# Patient Record
Sex: MALE | Race: WHITE | NOT HISPANIC OR LATINO
[De-identification: names, ages, dates, MRNs, and addresses within clinical notes are randomized per-mention and may not be internally consistent; named-entity substitution may affect disease eponyms.]

---

## 2017-10-23 ENCOUNTER — OTHER (OUTPATIENT)
Age: 82
End: 2017-10-23

## 2017-11-16 ENCOUNTER — RX RENEWAL (OUTPATIENT)
Age: 82
End: 2017-11-16

## 2017-11-30 ENCOUNTER — APPOINTMENT (OUTPATIENT)
Dept: FAMILY MEDICINE | Facility: CLINIC | Age: 82
End: 2017-11-30
Payer: MEDICARE

## 2017-11-30 VITALS
OXYGEN SATURATION: 98 % | HEIGHT: 67 IN | DIASTOLIC BLOOD PRESSURE: 70 MMHG | WEIGHT: 174 LBS | HEART RATE: 70 BPM | RESPIRATION RATE: 12 BRPM | SYSTOLIC BLOOD PRESSURE: 124 MMHG | BODY MASS INDEX: 27.31 KG/M2

## 2017-11-30 DIAGNOSIS — Z87.39 PERSONAL HISTORY OF OTHER DISEASES OF THE MUSCULOSKELETAL SYSTEM AND CONNECTIVE TISSUE: ICD-10-CM

## 2017-11-30 DIAGNOSIS — Z83.6 FAMILY HISTORY OF OTHER DISEASES OF THE RESPIRATORY SYSTEM: ICD-10-CM

## 2017-11-30 DIAGNOSIS — Z78.9 OTHER SPECIFIED HEALTH STATUS: ICD-10-CM

## 2017-11-30 DIAGNOSIS — Z82.49 FAMILY HISTORY OF ISCHEMIC HEART DISEASE AND OTHER DISEASES OF THE CIRCULATORY SYSTEM: ICD-10-CM

## 2017-11-30 DIAGNOSIS — Z82.0 FAMILY HISTORY OF EPILEPSY AND OTHER DISEASES OF THE NERVOUS SYSTEM: ICD-10-CM

## 2017-11-30 PROCEDURE — 99213 OFFICE O/P EST LOW 20 MIN: CPT | Mod: 25

## 2017-11-30 PROCEDURE — 90662 IIV NO PRSV INCREASED AG IM: CPT

## 2017-11-30 PROCEDURE — G0008: CPT

## 2017-11-30 RX ORDER — TIZANIDINE 4 MG/1
4 TABLET ORAL
Qty: 30 | Refills: 1 | Status: DISCONTINUED | COMMUNITY
Start: 2017-11-16 | End: 2017-11-30

## 2017-12-15 ENCOUNTER — APPOINTMENT (OUTPATIENT)
Dept: FAMILY MEDICINE | Facility: CLINIC | Age: 82
End: 2017-12-15

## 2018-02-15 ENCOUNTER — NON-APPOINTMENT (OUTPATIENT)
Age: 83
End: 2018-02-15

## 2018-02-15 ENCOUNTER — APPOINTMENT (OUTPATIENT)
Dept: FAMILY MEDICINE | Facility: CLINIC | Age: 83
End: 2018-02-15
Payer: MEDICARE

## 2018-02-15 VITALS
HEIGHT: 67 IN | HEART RATE: 74 BPM | RESPIRATION RATE: 12 BRPM | SYSTOLIC BLOOD PRESSURE: 122 MMHG | WEIGHT: 176 LBS | OXYGEN SATURATION: 97 % | DIASTOLIC BLOOD PRESSURE: 80 MMHG | BODY MASS INDEX: 27.62 KG/M2

## 2018-02-15 DIAGNOSIS — N40.0 BENIGN PROSTATIC HYPERPLASIA WITHOUT LOWER URINARY TRACT SYMPMS: ICD-10-CM

## 2018-02-15 DIAGNOSIS — Z00.00 ENCOUNTER FOR GENERAL ADULT MEDICAL EXAMINATION W/OUT ABNORMAL FINDINGS: ICD-10-CM

## 2018-02-15 LAB
BILIRUB UR QL STRIP: NEGATIVE
GLUCOSE UR-MCNC: NEGATIVE
HCG UR QL: 0.2 EU/DL
HGB UR QL STRIP.AUTO: NEGATIVE
KETONES UR-MCNC: NEGATIVE
LEUKOCYTE ESTERASE UR QL STRIP: NEGATIVE
NITRITE UR QL STRIP: NEGATIVE
PH UR STRIP: 7
PROT UR STRIP-MCNC: NEGATIVE
SP GR UR STRIP: 1.01

## 2018-02-15 PROCEDURE — 93000 ELECTROCARDIOGRAM COMPLETE: CPT

## 2018-02-15 PROCEDURE — 81003 URINALYSIS AUTO W/O SCOPE: CPT | Mod: QW

## 2018-02-15 PROCEDURE — G0439: CPT

## 2018-04-02 ENCOUNTER — RX RENEWAL (OUTPATIENT)
Age: 83
End: 2018-04-02

## 2018-07-12 ENCOUNTER — RX RENEWAL (OUTPATIENT)
Age: 83
End: 2018-07-12

## 2018-07-12 RX ORDER — ALPRAZOLAM 0.25 MG/1
0.25 TABLET ORAL
Qty: 100 | Refills: 0 | Status: DISCONTINUED | COMMUNITY
Start: 2018-04-02 | End: 2018-07-12

## 2018-10-02 ENCOUNTER — APPOINTMENT (OUTPATIENT)
Dept: FAMILY MEDICINE | Facility: CLINIC | Age: 83
End: 2018-10-02
Payer: MEDICARE

## 2018-10-02 VITALS
OXYGEN SATURATION: 97 % | SYSTOLIC BLOOD PRESSURE: 110 MMHG | DIASTOLIC BLOOD PRESSURE: 70 MMHG | HEART RATE: 62 BPM | RESPIRATION RATE: 12 BRPM

## 2018-10-02 PROCEDURE — 90662 IIV NO PRSV INCREASED AG IM: CPT

## 2018-10-02 PROCEDURE — G0008: CPT

## 2018-10-02 PROCEDURE — 99213 OFFICE O/P EST LOW 20 MIN: CPT | Mod: 25

## 2018-10-02 RX ORDER — METOPROLOL SUCCINATE 50 MG/1
50 TABLET, EXTENDED RELEASE ORAL DAILY
Qty: 3 | Refills: 0 | Status: ACTIVE | COMMUNITY
Start: 2017-06-26

## 2018-10-02 NOTE — PHYSICAL EXAM
[No Acute Distress] : no acute distress [Well Nourished] : well nourished [Well Developed] : well developed [Well-Appearing] : well-appearing [No JVD] : no jugular venous distention [Supple] : supple [No Lymphadenopathy] : no lymphadenopathy [No Respiratory Distress] : no respiratory distress  [Clear to Auscultation] : lungs were clear to auscultation bilaterally [Normal Rate] : normal rate  [Regular Rhythm] : with a regular rhythm [No Carotid Bruits] : no carotid bruits [Normal Posterior Cervical Nodes] : no posterior cervical lymphadenopathy [Normal Anterior Cervical Nodes] : no anterior cervical lymphadenopathy [de-identified] : 1-2/6 murmur [de-identified] : some memory issues

## 2018-10-02 NOTE — HISTORY OF PRESENT ILLNESS
[FreeTextEntry1] : pt presents for med check and flu shot  [de-identified] : Pt here for management of HTN and insomnia.

## 2018-10-02 NOTE — ASSESSMENT
[FreeTextEntry1] : 1. HTN- BP well controlled - 110/70- on  metoprolol succinate 50 mg 1/day.  2. and 3. Rx alprazolam 0.25 mg  # 100 - usually only used at HS.  Use/precautions reviewed. 4 HD flu shot given.

## 2018-10-22 ENCOUNTER — APPOINTMENT (OUTPATIENT)
Dept: FAMILY MEDICINE | Facility: CLINIC | Age: 83
End: 2018-10-22
Payer: MEDICARE

## 2018-10-22 VITALS
SYSTOLIC BLOOD PRESSURE: 114 MMHG | HEART RATE: 68 BPM | RESPIRATION RATE: 12 BRPM | OXYGEN SATURATION: 97 % | DIASTOLIC BLOOD PRESSURE: 70 MMHG | BODY MASS INDEX: 26.31 KG/M2 | WEIGHT: 168 LBS

## 2018-10-22 DIAGNOSIS — G45.9 TRANSIENT CEREBRAL ISCHEMIC ATTACK, UNSPECIFIED: ICD-10-CM

## 2018-10-22 PROCEDURE — 99214 OFFICE O/P EST MOD 30 MIN: CPT

## 2018-10-22 NOTE — HISTORY OF PRESENT ILLNESS
[FreeTextEntry1] : pt presents for [facility] follow up  [de-identified] : Some swelling in the front of his left shoulder .  About 9 days ago he had an issue with his left leg - couldn't move it and it felt numb. He went to [facility] ER.  He had an MRI  of the brain, saw neurology and was released with diagnosis of a TIA - placed on new medications. Left leg is now fine - it was fine about 2 hours after it initially bothered him. He will be following up with cardiology and the neurologist he saw in the hospital. In the ER his BP was 170/100

## 2018-10-22 NOTE — REVIEW OF SYSTEMS
[Joint Pain] : joint pain [Joint Stiffness] : joint stiffness [Memory Loss] : memory loss [Negative] : Heme/Lymph [FreeTextEntry9] : swelling in left anterior shoulder.

## 2018-10-22 NOTE — PHYSICAL EXAM
[No Acute Distress] : no acute distress [Well Nourished] : well nourished [Well Developed] : well developed [Well-Appearing] : well-appearing [No JVD] : no jugular venous distention [Supple] : supple [No Lymphadenopathy] : no lymphadenopathy [No Respiratory Distress] : no respiratory distress  [Clear to Auscultation] : lungs were clear to auscultation bilaterally [Normal Rate] : normal rate  [Regular Rhythm] : with a regular rhythm [No Carotid Bruits] : no carotid bruits [Normal Posterior Cervical Nodes] : no posterior cervical lymphadenopathy [Normal Anterior Cervical Nodes] : no anterior cervical lymphadenopathy [Normal Gait] : normal gait [Coordination Grossly Intact] : coordination grossly intact [No Focal Deficits] : no focal deficits [Speech Grossly Normal] : speech grossly normal [Alert and Oriented x3] : oriented to person, place, and time [Normal Insight/Judgement] : insight and judgment were intact [de-identified] : 2/6 murmur [de-identified] : soft tissue mass anterior left shoulder, nontender [de-identified] : No redness, no swelling at site of flu shot.  [de-identified] : has some memory issues - needs prompting from his wife about certain things

## 2018-10-22 NOTE — ASSESSMENT
[FreeTextEntry1] : 1. 2. and 4.  I reviewed new medications , chart updated, use and precautions all reviewed.  He now feels fine. BP is excellent - 114/70.  I reviewed follow up with both cardiology and neurology. Diet/actvities/fall precautions all discussed.   3. Lipoma of left shoulder - discussed what it is and just observe.

## 2018-11-29 LAB — PSA SERPL-MCNC: NORMAL

## 2018-12-31 ENCOUNTER — MEDICATION RENEWAL (OUTPATIENT)
Age: 83
End: 2018-12-31

## 2019-01-04 ENCOUNTER — APPOINTMENT (OUTPATIENT)
Dept: FAMILY MEDICINE | Facility: CLINIC | Age: 84
End: 2019-01-04
Payer: MEDICARE

## 2019-01-04 VITALS
WEIGHT: 168 LBS | HEIGHT: 67 IN | BODY MASS INDEX: 26.37 KG/M2 | DIASTOLIC BLOOD PRESSURE: 70 MMHG | RESPIRATION RATE: 14 BRPM | HEART RATE: 73 BPM | SYSTOLIC BLOOD PRESSURE: 110 MMHG | OXYGEN SATURATION: 98 %

## 2019-01-04 PROCEDURE — 99213 OFFICE O/P EST LOW 20 MIN: CPT

## 2019-01-04 NOTE — HISTORY OF PRESENT ILLNESS
[FreeTextEntry1] : Patient presents for med check\par  [de-identified] : Pt here for treatment of anxiety and insomnia.

## 2019-01-04 NOTE — HEALTH RISK ASSESSMENT
[] : No [No falls in past year] : Patient reported no falls in the past year [0] : 2) Feeling down, depressed, or hopeless: Not at all (0) [de-identified] : social [[id number]] : 0

## 2019-01-04 NOTE — ASSESSMENT
[FreeTextEntry1] : 1. and 2.  Rx for alprazolam 0.25 mg # 100 MDD 4-  Use/precautions/safety of medication all reviewed.  [place]   Diet/activity/fall precautions reviewed. \par

## 2019-01-04 NOTE — PHYSICAL EXAM
[No Acute Distress] : no acute distress [Well Nourished] : well nourished [Well Developed] : well developed [Well-Appearing] : well-appearing [No JVD] : no jugular venous distention [Supple] : supple [No Lymphadenopathy] : no lymphadenopathy [No Respiratory Distress] : no respiratory distress  [Clear to Auscultation] : lungs were clear to auscultation bilaterally [No Accessory Muscle Use] : no accessory muscle use [Normal Rate] : normal rate  [Regular Rhythm] : with a regular rhythm [No Murmur] : no murmur heard [No Carotid Bruits] : no carotid bruits [Normal Posterior Cervical Nodes] : no posterior cervical lymphadenopathy [Normal Anterior Cervical Nodes] : no anterior cervical lymphadenopathy [Speech Grossly Normal] : speech grossly normal [Memory Grossly Normal] : memory grossly normal [Normal Affect] : the affect was normal [Alert and Oriented x3] : oriented to person, place, and time [Normal Mood] : the mood was normal [Normal Insight/Judgement] : insight and judgment were intact

## 2019-04-19 ENCOUNTER — APPOINTMENT (OUTPATIENT)
Dept: FAMILY MEDICINE | Facility: CLINIC | Age: 84
End: 2019-04-19
Payer: MEDICARE

## 2019-04-19 VITALS
HEART RATE: 80 BPM | OXYGEN SATURATION: 97 % | RESPIRATION RATE: 14 BRPM | BODY MASS INDEX: 26.53 KG/M2 | SYSTOLIC BLOOD PRESSURE: 110 MMHG | WEIGHT: 169 LBS | HEIGHT: 67 IN | DIASTOLIC BLOOD PRESSURE: 70 MMHG

## 2019-04-19 PROCEDURE — 99213 OFFICE O/P EST LOW 20 MIN: CPT

## 2019-04-19 RX ORDER — DUTASTERIDE 0.5 MG/1
0.5 CAPSULE, LIQUID FILLED ORAL
Qty: 90 | Refills: 3 | Status: ACTIVE | COMMUNITY
Start: 2019-04-19

## 2019-04-19 RX ORDER — APIXABAN 5 MG/1
5 TABLET, FILM COATED ORAL
Qty: 60 | Refills: 3 | Status: ACTIVE | COMMUNITY
Start: 2019-04-19

## 2019-04-19 RX ORDER — ASPIRIN 81 MG/1
81 TABLET ORAL
Refills: 0 | Status: DISCONTINUED | COMMUNITY
End: 2019-04-19

## 2019-04-19 RX ORDER — INDOMETHACIN 50 MG/1
50 CAPSULE ORAL 3 TIMES DAILY
Qty: 30 | Refills: 2 | Status: DISCONTINUED | COMMUNITY
Start: 2017-11-30 | End: 2019-04-19

## 2019-04-19 RX ORDER — CLOPIDOGREL BISULFATE 75 MG/1
75 TABLET, FILM COATED ORAL
Refills: 0 | Status: DISCONTINUED | COMMUNITY
End: 2019-04-19

## 2019-04-19 RX ORDER — TAMSULOSIN HYDROCHLORIDE 0.4 MG/1
0.4 CAPSULE ORAL
Qty: 90 | Refills: 0 | Status: DISCONTINUED | COMMUNITY
Start: 2017-01-13 | End: 2019-04-19

## 2019-04-19 NOTE — ASSESSMENT
[FreeTextEntry1] : 1.  and 2.  Alprazolam was renewed  Use/precautions/safety of medication all reviewed.  [place]\par 3.  Diet and use of atorvastatin were reviewed.

## 2019-04-19 NOTE — HISTORY OF PRESENT ILLNESS
[FreeTextEntry1] : Patient presents for follow up\par  [de-identified] : Here for management of anxiety and insomnia. He is starting to have memory issues and at times his wife has a difficult time with him - he gets angry about his situation.  Using one half a tablet at these times helps calm him down.

## 2019-04-19 NOTE — REVIEW OF SYSTEMS
[Joint Pain] : joint pain [Joint Stiffness] : joint stiffness [Memory Loss] : memory loss [Insomnia] : insomnia [Anxiety] : anxiety [Negative] : Psychiatric

## 2019-04-19 NOTE — HEALTH RISK ASSESSMENT
[] : No [No falls in past year] : Patient reported no falls in the past year [0] : 2) Feeling down, depressed, or hopeless: Not at all (0) [de-identified] : social [de-identified] : no exercise [de-identified] : low fat [[id number]] : 0

## 2019-06-24 ENCOUNTER — APPOINTMENT (OUTPATIENT)
Dept: FAMILY MEDICINE | Facility: CLINIC | Age: 84
End: 2019-06-24
Payer: MEDICARE

## 2019-06-24 VITALS
RESPIRATION RATE: 14 BRPM | HEIGHT: 67 IN | DIASTOLIC BLOOD PRESSURE: 70 MMHG | BODY MASS INDEX: 27.47 KG/M2 | SYSTOLIC BLOOD PRESSURE: 122 MMHG | WEIGHT: 175 LBS | OXYGEN SATURATION: 97 % | HEART RATE: 74 BPM

## 2019-06-24 PROCEDURE — 99214 OFFICE O/P EST MOD 30 MIN: CPT

## 2019-06-24 NOTE — ASSESSMENT
[FreeTextEntry1] : Advised follow up with cardiology given dyspnea on exertion and LE swelling \par Echo ordered r/o CHF \par CXR r/o pneumonia \par Start antibiotics - take with food. \par Vitals stable today \par Rest, fluids, flonase, vitamin C \par     - patient instructed to RTO if symptoms worsen or persist, if fevers develop, does not get better in 7 days.\par \par Pt and wife advised to call me or go directly to nearest ED if sob acutely worsens, chest pain, fevers/chills develop. \par

## 2019-06-24 NOTE — REVIEW OF SYSTEMS
[Fatigue] : fatigue [Lower Ext Edema] : lower extremity edema [Postnasal Drip] : postnasal drip [Cough] : cough [Dyspnea on Exertion] : dyspnea on exertion [Negative] : Heme/Lymph [FreeTextEntry4] : congestion, sinus pressure

## 2019-06-24 NOTE — HISTORY OF PRESENT ILLNESS
[___ Weeks ago] :  [unfilled] weeks ago [Spouse] : spouse [FreeTextEntry8] : +chest congestion, +SOB with activity, + dry cough, +sinus pressure + PND +bilateral LE swelling x 2 weeks. He was recently traveling in [place]. Pt and wife state that he found he was getting more fatigued than normal, had to stop more frequently to catch his breath. Patient recently saw his cardiologist at the beginning of the month but he did not have swelling at that time. He has been taking dayquil and nyquil with some relief. Denies fevers, chills, chest pain, palpitations, dizziness, syncope, ear pain, headache, nasal congestion.

## 2019-06-24 NOTE — PHYSICAL EXAM
[No Acute Distress] : no acute distress [Well Nourished] : well nourished [Well-Appearing] : well-appearing [Normal Sclera/Conjunctiva] : normal sclera/conjunctiva [Well Developed] : well developed [Supple] : supple [No Respiratory Distress] : no respiratory distress  [Normal Outer Ear/Nose] : the outer ears and nose were normal in appearance [Normal Rate] : normal rate  [No Accessory Muscle Use] : no accessory muscle use [Pedal Pulses Present] : the pedal pulses are present [Normal S1, S2] : normal S1 and S2 [No Extremity Clubbing/Cyanosis] : no extremity clubbing/cyanosis [Normal Gait] : normal gait [Coordination Grossly Intact] : coordination grossly intact [No Focal Deficits] : no focal deficits [Alert and Oriented x3] : oriented to person, place, and time [Normal Affect] : the affect was normal [Normal Insight/Judgement] : insight and judgment were intact [de-identified] : bilateral nasal turbinates and posterior oropharynx erythematous, mild clear post nasal drip [de-identified] : mild cervical adenopathy [de-identified] : Diminished breath sounds auscultated to LLL, otherwise CTA.  [de-identified] : irregular, h/o afib, 2/6 systolic murmur [de-identified] : 2+ bilateral LE edema L>R.

## 2019-07-19 ENCOUNTER — MEDICATION RENEWAL (OUTPATIENT)
Age: 84
End: 2019-07-19

## 2019-08-12 ENCOUNTER — APPOINTMENT (OUTPATIENT)
Dept: FAMILY MEDICINE | Facility: CLINIC | Age: 84
End: 2019-08-12
Payer: MEDICARE

## 2019-08-12 VITALS
OXYGEN SATURATION: 97 % | WEIGHT: 172 LBS | BODY MASS INDEX: 27 KG/M2 | HEIGHT: 67 IN | SYSTOLIC BLOOD PRESSURE: 120 MMHG | RESPIRATION RATE: 14 BRPM | DIASTOLIC BLOOD PRESSURE: 70 MMHG | HEART RATE: 77 BPM

## 2019-08-12 PROCEDURE — 99213 OFFICE O/P EST LOW 20 MIN: CPT

## 2019-08-12 NOTE — ASSESSMENT
[FreeTextEntry1] : Start medrol pack, take with food\par PT offered, pt declined\par Advised to stop nsaids d/t increased bleeding risk. advised to take tylenol 1000mg TID prn pain\par RTO if symptoms worsen or persist.

## 2019-08-12 NOTE — PHYSICAL EXAM
[Normal] : affect was normal and insight and judgment were intact [de-identified] : R elbow: no swelling, crepitus, or erythema noted. Pain noted with active ROM, full strength and no limitation of ROM.

## 2019-08-12 NOTE — HISTORY OF PRESENT ILLNESS
[FreeTextEntry8] : pt c/o R elbow and forearm pain x 1 week, Pt states he was doing yard work and was using a heavy weed whacker. Denies swelling, redness, numbness, tingling, weakness. Reports difficulty combing his hair due to pain from that motion. Has been taking ibuprofen with some relief.

## 2019-08-16 ENCOUNTER — RX RENEWAL (OUTPATIENT)
Age: 84
End: 2019-08-16

## 2019-10-12 ENCOUNTER — RX RENEWAL (OUTPATIENT)
Age: 84
End: 2019-10-12

## 2019-10-30 ENCOUNTER — APPOINTMENT (OUTPATIENT)
Dept: FAMILY MEDICINE | Facility: CLINIC | Age: 84
End: 2019-10-30
Payer: MEDICARE

## 2019-10-30 VITALS
OXYGEN SATURATION: 97 % | DIASTOLIC BLOOD PRESSURE: 70 MMHG | RESPIRATION RATE: 12 BRPM | HEART RATE: 71 BPM | SYSTOLIC BLOOD PRESSURE: 120 MMHG

## 2019-10-30 DIAGNOSIS — R06.02 SHORTNESS OF BREATH: ICD-10-CM

## 2019-10-30 DIAGNOSIS — S56.911A STRAIN OF UNSPECIFIED MUSCLES, FASCIA AND TENDONS AT FOREARM LEVEL, RIGHT ARM, INITIAL ENCOUNTER: ICD-10-CM

## 2019-10-30 DIAGNOSIS — R06.89 OTHER ABNORMALITIES OF BREATHING: ICD-10-CM

## 2019-10-30 DIAGNOSIS — M25.521 PAIN IN RIGHT ELBOW: ICD-10-CM

## 2019-10-30 PROCEDURE — 90653 IIV ADJUVANT VACCINE IM: CPT

## 2019-10-30 PROCEDURE — G0009: CPT

## 2019-10-30 PROCEDURE — 90732 PPSV23 VACC 2 YRS+ SUBQ/IM: CPT

## 2019-10-30 PROCEDURE — G0008: CPT

## 2019-10-30 PROCEDURE — 99214 OFFICE O/P EST MOD 30 MIN: CPT | Mod: 25

## 2019-10-30 NOTE — HISTORY OF PRESENT ILLNESS
[FreeTextEntry1] : pt presents for med check  [de-identified] : Patient requesting renewal of xanax. Patient tolerates xanax well, states it helps to relieve his ongoing anxiety and helps him to sleep better. Denies any aberrant behaviors or side effects or any suicidal thoughts/plans.

## 2019-10-30 NOTE — ASSESSMENT
[FreeTextEntry1] : -BP well controlled today - continue current regimen , pt will follow up with cardiologist as scheduled. \par - Patient advised of harmful side effects and risk of dependence with long term use of benzodiazepines. Advised caution related to sedative effect and and respiratory depression. Advised not to take while driving or in combination with alcohol. Meditation, mindfulness, exercise, and psychotherapy encouraged. ISTOP checked. Reference #: [id number] \par

## 2019-10-30 NOTE — PHYSICAL EXAM
[Normal Rate] : normal rate  [Normal S1, S2] : normal S1 and S2 [Normal] : affect was normal and insight and judgment were intact [No Murmur] : no murmur heard

## 2019-11-14 ENCOUNTER — APPOINTMENT (OUTPATIENT)
Dept: FAMILY MEDICINE | Facility: CLINIC | Age: 84
End: 2019-11-14
Payer: MEDICARE

## 2019-11-14 VITALS
RESPIRATION RATE: 14 BRPM | OXYGEN SATURATION: 96 % | DIASTOLIC BLOOD PRESSURE: 70 MMHG | WEIGHT: 172 LBS | SYSTOLIC BLOOD PRESSURE: 120 MMHG | BODY MASS INDEX: 27 KG/M2 | HEART RATE: 60 BPM | HEIGHT: 67 IN

## 2019-11-14 PROCEDURE — 99214 OFFICE O/P EST MOD 30 MIN: CPT

## 2019-11-14 NOTE — HISTORY OF PRESENT ILLNESS
[FreeTextEntry8] : Patient presents with a chief complaint of back pain x 2 weeks. Pt states he was cleaning up leaves outside. States pain is located in L mid back on left side. He has been taking ibuprofen with some relief. Denies any difficulty ambulating, weakness, numbness or tingling. Patient also reports associated shortness of breath with exertion such as walking or going uphill. Denies any sob at rest, chest pain, palpitations, dizziness, syncope, diaphoresis, or swelling. He states he saw his cardiologist approximately 6 weeks ago, had no issues, plans to see cardio again in December.

## 2019-11-14 NOTE — PHYSICAL EXAM
[Normal Sclera/Conjunctiva] : normal sclera/conjunctiva [Normal Outer Ear/Nose] : the outer ears and nose were normal in appearance [Normal Rate] : normal rate  [Normal S1, S2] : normal S1 and S2 [No Murmur] : no murmur heard [Normal] : affect was normal and insight and judgment were intact [de-identified] : irregular rhythm  [de-identified] : l

## 2019-11-14 NOTE — ASSESSMENT
[FreeTextEntry1] : Vitals and exam stable \par Pt advised to follow up with cardiology asap, he will notify me if he cannot get a timely appt. \par If sob worsens or chest pain develops pt will go to ER for evaluation, agreeable to plan\par RTO for full physical and fasting labs \par Back pain: Instructed to use heat, do stretches, salonpas pain patch prn\par Advised to hold ibuprofen, may take tylenol 1000mg prn  \par Instructed to take flexeril only before bedtime, advised of drowsiness side effect\par Instructed to get back brace for support\par \par \par

## 2019-12-16 ENCOUNTER — APPOINTMENT (OUTPATIENT)
Dept: FAMILY MEDICINE | Facility: CLINIC | Age: 84
End: 2019-12-16

## 2019-12-26 ENCOUNTER — APPOINTMENT (OUTPATIENT)
Dept: FAMILY MEDICINE | Facility: CLINIC | Age: 84
End: 2019-12-26
Payer: MEDICARE

## 2019-12-26 VITALS
OXYGEN SATURATION: 97 % | SYSTOLIC BLOOD PRESSURE: 110 MMHG | DIASTOLIC BLOOD PRESSURE: 70 MMHG | WEIGHT: 172 LBS | HEART RATE: 72 BPM | BODY MASS INDEX: 27 KG/M2 | HEIGHT: 67 IN | RESPIRATION RATE: 14 BRPM

## 2019-12-26 DIAGNOSIS — M62.830 MUSCLE SPASM OF BACK: ICD-10-CM

## 2019-12-26 DIAGNOSIS — M25.476 EFFUSION, UNSPECIFIED ANKLE: ICD-10-CM

## 2019-12-26 DIAGNOSIS — Z87.09 PERSONAL HISTORY OF OTHER DISEASES OF THE RESPIRATORY SYSTEM: ICD-10-CM

## 2019-12-26 DIAGNOSIS — R69 ILLNESS, UNSPECIFIED: ICD-10-CM

## 2019-12-26 DIAGNOSIS — Z92.29 PERSONAL HISTORY OF OTHER DRUG THERAPY: ICD-10-CM

## 2019-12-26 DIAGNOSIS — M25.473 EFFUSION, UNSPECIFIED ANKLE: ICD-10-CM

## 2019-12-26 DIAGNOSIS — Z09 ENCOUNTER FOR FOLLOW-UP EXAMINATION AFTER COMPLETED TREATMENT FOR CONDITIONS OTHER THAN MALIGNANT NEOPLASM: ICD-10-CM

## 2019-12-26 DIAGNOSIS — R06.02 SHORTNESS OF BREATH: ICD-10-CM

## 2019-12-26 PROCEDURE — 99214 OFFICE O/P EST MOD 30 MIN: CPT

## 2019-12-26 RX ORDER — AMOXICILLIN AND CLAVULANATE POTASSIUM 875; 125 MG/1; MG/1
875-125 TABLET, COATED ORAL
Qty: 20 | Refills: 0 | Status: COMPLETED | COMMUNITY
Start: 2019-06-24 | End: 2019-12-26

## 2019-12-26 NOTE — PHYSICAL EXAM
[Normal Outer Ear/Nose] : the outer ears and nose were normal in appearance [Normal Sclera/Conjunctiva] : normal sclera/conjunctiva [Normal] : affect was normal and insight and judgment were intact

## 2019-12-26 NOTE — HISTORY OF PRESENT ILLNESS
[Spouse] : spouse [FreeTextEntry1] : Patient presents for 1 month follow up\par  [de-identified] : Patient reports today for follow up of medical conditions. Patient reports feeling well today no acute complaints. Requesting renewal of xanax, pt states it is effective in relieving anxiety and insomnia. Denies aberrant behaviors or side effects. Denies suicidal thoughts or plans. Pt never saw cardiology - states he felt better so he did not feel it was necessary. He has appt scheduled in January with cardiology as per pt. \par \par

## 2019-12-26 NOTE — ASSESSMENT
[FreeTextEntry1] : Check labs - slip given\par Vitals and exam stable \par Continue current medication regimen\par - Patient advised of harmful side effects and risk of dependence with long term use of benzodiazepines. Advised caution related to sedative effect and and respiratory depression. Advised not to take while driving or in combination with alcohol. Meditation, mindfulness, exercise, and psychotherapy encouraged. ISTOP checked. Reference #: [id number] \par

## 2020-02-25 ENCOUNTER — RESULT REVIEW (OUTPATIENT)
Age: 85
End: 2020-02-25

## 2020-02-27 ENCOUNTER — RESULT REVIEW (OUTPATIENT)
Age: 85
End: 2020-02-27

## 2020-03-19 DIAGNOSIS — R74.8 ABNORMAL LEVELS OF OTHER SERUM ENZYMES: ICD-10-CM

## 2020-04-24 ENCOUNTER — APPOINTMENT (OUTPATIENT)
Dept: FAMILY MEDICINE | Facility: CLINIC | Age: 85
End: 2020-04-24
Payer: MEDICARE

## 2020-04-24 VITALS
HEIGHT: 67 IN | OXYGEN SATURATION: 97 % | HEART RATE: 66 BPM | WEIGHT: 173 LBS | SYSTOLIC BLOOD PRESSURE: 132 MMHG | BODY MASS INDEX: 27.15 KG/M2 | RESPIRATION RATE: 14 BRPM | DIASTOLIC BLOOD PRESSURE: 70 MMHG

## 2020-04-24 DIAGNOSIS — I38 ENDOCARDITIS, VALVE UNSPECIFIED: ICD-10-CM

## 2020-04-24 PROCEDURE — 99213 OFFICE O/P EST LOW 20 MIN: CPT

## 2020-04-24 RX ORDER — CYCLOBENZAPRINE HYDROCHLORIDE 10 MG/1
10 TABLET, FILM COATED ORAL
Qty: 30 | Refills: 0 | Status: DISCONTINUED | COMMUNITY
Start: 2019-11-14 | End: 2020-04-24

## 2020-04-24 RX ORDER — HYDROCHLOROTHIAZIDE 12.5 MG/1
12.5 TABLET ORAL
Qty: 20 | Refills: 0 | Status: DISCONTINUED | COMMUNITY
Start: 2019-06-24 | End: 2020-04-24

## 2020-04-24 RX ORDER — METHYLPREDNISOLONE 4 MG/1
4 TABLET ORAL
Qty: 1 | Refills: 0 | Status: DISCONTINUED | COMMUNITY
Start: 2019-08-12 | End: 2020-04-24

## 2020-04-24 NOTE — HISTORY OF PRESENT ILLNESS
[de-identified] : He has been having an issue with pleural effusions-  had thoracentesis done .  Felt much better.   [FreeTextEntry1] : patient presents for medication renewal\par

## 2020-04-24 NOTE — ASSESSMENT
[FreeTextEntry1] : 1. to 3.  We reviewed his need for thoracentesis  a few weeks ago - he is feeling better and  doesn't have exertional dyspnea.  He does have a follow up visit with Pulmonary.   Diet/ activity/ fall precautions all reviewed.  4. and 5.  I renewed his alprazolam 0.25 mg- Use/precautions/safety of medication all reviewed.  [place]  [id number]    RRC done. Safe use stressed.  COVID19 precautions reviewed.  His wife and I discussed his safe use of the alprazolam.  She takes care of it. \par

## 2020-04-24 NOTE — REVIEW OF SYSTEMS
[Dyspnea on Exertion] : dyspnea on exertion [Joint Pain] : joint pain [Joint Stiffness] : joint stiffness [Memory Loss] : memory loss [Insomnia] : insomnia [Anxiety] : anxiety [Negative] : Heme/Lymph

## 2020-07-27 NOTE — PHYSICAL EXAM
[Normal] : no respiratory distress, lungs were clear to auscultation bilaterally and no accessory muscle use [Normal Rate] : normal rate  Follow up with Dr Dickson in 4-6 weeks [No Edema] : there was no peripheral edema [No Carotid Bruits] : no carotid bruits [Normal Posterior Cervical Nodes] : no posterior cervical lymphadenopathy [Normal Anterior Cervical Nodes] : no anterior cervical lymphadenopathy [Speech Grossly Normal] : speech grossly normal [Alert and Oriented x3] : oriented to person, place, and time [Normal Insight/Judgement] : insight and judgment were intact [de-identified] : IR/IR,  1-2/6 murmur [de-identified] : anxious

## 2020-08-16 ENCOUNTER — RX RENEWAL (OUTPATIENT)
Age: 85
End: 2020-08-16

## 2020-08-18 ENCOUNTER — APPOINTMENT (OUTPATIENT)
Dept: FAMILY MEDICINE | Facility: CLINIC | Age: 85
End: 2020-08-18
Payer: MEDICARE

## 2020-08-18 VITALS
HEART RATE: 81 BPM | RESPIRATION RATE: 14 BRPM | HEIGHT: 67 IN | SYSTOLIC BLOOD PRESSURE: 140 MMHG | DIASTOLIC BLOOD PRESSURE: 80 MMHG | WEIGHT: 170 LBS | OXYGEN SATURATION: 96 % | TEMPERATURE: 97.8 F | BODY MASS INDEX: 26.68 KG/M2

## 2020-08-18 PROCEDURE — 99214 OFFICE O/P EST MOD 30 MIN: CPT

## 2020-08-18 RX ORDER — SILODOSIN 8 MG/1
8 CAPSULE ORAL DAILY
Qty: 30 | Refills: 0 | Status: ACTIVE | COMMUNITY
Start: 2020-08-18

## 2020-08-18 RX ORDER — ATORVASTATIN CALCIUM 10 MG/1
10 TABLET, FILM COATED ORAL
Qty: 90 | Refills: 1 | Status: ACTIVE | COMMUNITY

## 2020-08-18 RX ORDER — PNV NO.95/FERROUS FUM/FOLIC AC 28MG-0.8MG
TABLET ORAL
Refills: 0 | Status: ACTIVE | COMMUNITY

## 2020-08-18 RX ORDER — OMEGA-3/DHA/EPA/FISH OIL 910-1400MG
CAPSULE ORAL
Refills: 0 | Status: ACTIVE | COMMUNITY

## 2020-08-18 NOTE — HISTORY OF PRESENT ILLNESS
[FreeTextEntry1] : patient presents for medication renewal\par  [de-identified] : Patient reports today for follow up of medical conditions. Patient reports feeling well today no acute complaints. Requesting renewal of xanax, pt states it is effective in relieving anxiety and insomnia. Denies aberrant behaviors or side effects. Denies suicidal thoughts or plans. He is following up with dr sandhu for pleural effusions, which he is on lasix for. Pt has no sob. Follow up with cardio is in 6 weeks as per pt. \par

## 2020-08-18 NOTE — PHYSICAL EXAM
[Normal Sclera/Conjunctiva] : normal sclera/conjunctiva [Normal Outer Ear/Nose] : the outer ears and nose were normal in appearance [Normal Rate] : normal rate  [Normal S1, S2] : normal S1 and S2 [No Edema] : there was no peripheral edema [No Focal Deficits] : no focal deficits [Normal] : affect was normal and insight and judgment were intact [de-identified] : IRREGULAR

## 2020-08-18 NOTE — ASSESSMENT
[FreeTextEntry1] : Vitals and exam stable, bp borderline - pt will monitor bp at home to make sure it stays at goal <140/90. Patient states he didn't take his morning medication yet. Follow up cardiology.  \par Follow up pulmonary for h/o pleural effusion, lungs clear today\par Continue current medication regimen\par - Patient advised of harmful side effects and risk of dependence with long term use of benzodiazepines. Advised caution related to sedative effect and and respiratory depression. Advised not to take while driving or in combination with alcohol. Meditation, mindfulness, exercise, and psychotherapy encouraged. ISTOP checked. Reference #: [id number] \par

## 2020-11-18 ENCOUNTER — APPOINTMENT (OUTPATIENT)
Dept: FAMILY MEDICINE | Facility: CLINIC | Age: 85
End: 2020-11-18
Payer: MEDICARE

## 2020-11-18 VITALS
TEMPERATURE: 97.3 F | HEIGHT: 67 IN | OXYGEN SATURATION: 97 % | SYSTOLIC BLOOD PRESSURE: 120 MMHG | RESPIRATION RATE: 14 BRPM | WEIGHT: 172 LBS | HEART RATE: 68 BPM | BODY MASS INDEX: 27 KG/M2 | DIASTOLIC BLOOD PRESSURE: 72 MMHG

## 2020-11-18 PROCEDURE — 99214 OFFICE O/P EST MOD 30 MIN: CPT | Mod: 25

## 2020-11-18 PROCEDURE — 90662 IIV NO PRSV INCREASED AG IM: CPT

## 2020-11-18 PROCEDURE — G0008: CPT

## 2020-11-18 NOTE — ASSESSMENT
[FreeTextEntry1] : Vitals and exam stable\par Check labs \par Follow up pulmonary for h/o pleural effusion, lungs clear today\par Continue current medication regimen\par - Patient advised of harmful side effects and risk of dependence with long term use of benzodiazepines. Advised caution related to sedative effect and and respiratory depression. Advised not to take while driving or in combination with alcohol. Meditation, mindfulness, exercise, and psychotherapy encouraged. ISTOP checked.

## 2020-11-18 NOTE — PHYSICAL EXAM
[Normal Sclera/Conjunctiva] : normal sclera/conjunctiva [Normal Outer Ear/Nose] : the outer ears and nose were normal in appearance [Normal Rate] : normal rate  [Normal S1, S2] : normal S1 and S2 [No Edema] : there was no peripheral edema [No Focal Deficits] : no focal deficits [Normal] : affect was normal and insight and judgment were intact [de-identified] : IRREGULAR

## 2020-11-18 NOTE — HISTORY OF PRESENT ILLNESS
[FreeTextEntry1] : Patient presents for follow up  [de-identified] : Patient reports today for follow up of medical conditions. Patient reports feeling well today no acute complaints. Requesting renewal of xanax, pt states it is effective in relieving anxiety and insomnia. His spouse joesph notes some mood swings and insomnia. Denies aberrant behaviors or side effects. Denies suicidal thoughts or plans. He is following up with dr sandhu for pleural effusions, which he is on lasix for. Pt has no sob. \par

## 2020-12-31 ENCOUNTER — RESULT REVIEW (OUTPATIENT)
Age: 85
End: 2020-12-31

## 2021-01-01 ENCOUNTER — INPATIENT (INPATIENT)
Facility: HOSPITAL | Age: 86
LOS: 0 days | Discharge: HOME | End: 2021-12-29
Payer: MEDICARE

## 2021-01-01 ENCOUNTER — APPOINTMENT (OUTPATIENT)
Dept: FAMILY MEDICINE | Facility: CLINIC | Age: 86
End: 2021-01-01
Payer: MEDICARE

## 2021-01-01 ENCOUNTER — NON-APPOINTMENT (OUTPATIENT)
Age: 86
End: 2021-01-01

## 2021-01-01 ENCOUNTER — TRANSCRIPTION ENCOUNTER (OUTPATIENT)
Age: 86
End: 2021-01-01

## 2021-01-01 ENCOUNTER — APPOINTMENT (OUTPATIENT)
Dept: FAMILY MEDICINE | Facility: CLINIC | Age: 86
End: 2021-01-01

## 2021-01-01 ENCOUNTER — RESULT REVIEW (OUTPATIENT)
Age: 86
End: 2021-01-01

## 2021-01-01 ENCOUNTER — EMERGENCY (EMERGENCY)
Facility: HOSPITAL | Age: 86
LOS: 1 days | End: 2021-01-01
Admitting: EMERGENCY MEDICINE
Payer: MEDICARE

## 2021-01-01 ENCOUNTER — APPOINTMENT (OUTPATIENT)
Dept: OPHTHALMOLOGY | Facility: CLINIC | Age: 86
End: 2021-01-01
Payer: MEDICARE

## 2021-01-01 ENCOUNTER — APPOINTMENT (OUTPATIENT)
Dept: OPHTHALMOLOGY | Facility: CLINIC | Age: 86
End: 2021-01-01

## 2021-01-01 ENCOUNTER — OUTPATIENT (OUTPATIENT)
Dept: OUTPATIENT SERVICES | Facility: HOSPITAL | Age: 86
LOS: 1 days | End: 2021-01-01

## 2021-01-01 VITALS
BODY MASS INDEX: 24.55 KG/M2 | DIASTOLIC BLOOD PRESSURE: 60 MMHG | RESPIRATION RATE: 12 BRPM | HEART RATE: 69 BPM | OXYGEN SATURATION: 97 % | WEIGHT: 162 LBS | HEIGHT: 68 IN | SYSTOLIC BLOOD PRESSURE: 110 MMHG

## 2021-01-01 VITALS
HEIGHT: 68 IN | BODY MASS INDEX: 25.76 KG/M2 | DIASTOLIC BLOOD PRESSURE: 74 MMHG | SYSTOLIC BLOOD PRESSURE: 128 MMHG | TEMPERATURE: 98.1 F | RESPIRATION RATE: 14 BRPM | OXYGEN SATURATION: 98 % | WEIGHT: 170 LBS | HEART RATE: 71 BPM

## 2021-01-01 VITALS
SYSTOLIC BLOOD PRESSURE: 125 MMHG | TEMPERATURE: 97.7 F | DIASTOLIC BLOOD PRESSURE: 80 MMHG | HEIGHT: 68 IN | BODY MASS INDEX: 25.76 KG/M2 | OXYGEN SATURATION: 97 % | HEART RATE: 85 BPM | RESPIRATION RATE: 14 BRPM | WEIGHT: 170 LBS

## 2021-01-01 VITALS
SYSTOLIC BLOOD PRESSURE: 110 MMHG | BODY MASS INDEX: 24.55 KG/M2 | RESPIRATION RATE: 15 BRPM | DIASTOLIC BLOOD PRESSURE: 60 MMHG | WEIGHT: 162 LBS | OXYGEN SATURATION: 98 % | HEART RATE: 86 BPM | HEIGHT: 68 IN | TEMPERATURE: 97.3 F

## 2021-01-01 VITALS — TEMPERATURE: 97.3 F

## 2021-01-01 DIAGNOSIS — Z87.39 PERSONAL HISTORY OF OTHER DISEASES OF THE MUSCULOSKELETAL SYSTEM AND CONNECTIVE TISSUE: ICD-10-CM

## 2021-01-01 DIAGNOSIS — S09.90XA UNSPECIFIED INJURY OF HEAD, INITIAL ENCOUNTER: ICD-10-CM

## 2021-01-01 DIAGNOSIS — Z23 ENCOUNTER FOR IMMUNIZATION: ICD-10-CM

## 2021-01-01 DIAGNOSIS — L76.82 OTHER POSTPROCEDURAL COMPLICATIONS OF SKIN AND SUBCUTANEOUS TISSUE: ICD-10-CM

## 2021-01-01 DIAGNOSIS — M53.3 SACROCOCCYGEAL DISORDERS, NOT ELSEWHERE CLASSIFIED: ICD-10-CM

## 2021-01-01 DIAGNOSIS — R06.2 WHEEZING: ICD-10-CM

## 2021-01-01 DIAGNOSIS — G47.00 INSOMNIA, UNSPECIFIED: ICD-10-CM

## 2021-01-01 DIAGNOSIS — M54.2 CERVICALGIA: ICD-10-CM

## 2021-01-01 DIAGNOSIS — E78.5 HYPERLIPIDEMIA, UNSPECIFIED: ICD-10-CM

## 2021-01-01 PROCEDURE — 92134 CPTRZ OPH DX IMG PST SGM RTA: CPT

## 2021-01-01 PROCEDURE — 99213 OFFICE O/P EST LOW 20 MIN: CPT

## 2021-01-01 PROCEDURE — 99285 EMERGENCY DEPT VISIT HI MDM: CPT

## 2021-01-01 PROCEDURE — 99214 OFFICE O/P EST MOD 30 MIN: CPT | Mod: 25

## 2021-01-01 PROCEDURE — 99214 OFFICE O/P EST MOD 30 MIN: CPT

## 2021-01-01 PROCEDURE — 93010 ELECTROCARDIOGRAM REPORT: CPT

## 2021-01-01 PROCEDURE — 70450 CT HEAD/BRAIN W/O DYE: CPT | Mod: 26

## 2021-01-01 PROCEDURE — 92083 EXTENDED VISUAL FIELD XM: CPT

## 2021-01-01 PROCEDURE — 71045 X-RAY EXAM CHEST 1 VIEW: CPT | Mod: 26

## 2021-01-01 PROCEDURE — 99285 EMERGENCY DEPT VISIT HI MDM: CPT | Mod: CS

## 2021-01-01 PROCEDURE — 96372 THER/PROPH/DIAG INJ SC/IM: CPT

## 2021-01-01 PROCEDURE — 71250 CT THORAX DX C-: CPT | Mod: 26

## 2021-01-01 PROCEDURE — 92133 CPTRZD OPH DX IMG PST SGM ON: CPT

## 2021-01-01 PROCEDURE — 92014 COMPRE OPH EXAM EST PT 1/>: CPT

## 2021-01-01 RX ORDER — ALBUTEROL SULFATE 2.5 MG/3ML
(2.5 MG/3ML) SOLUTION RESPIRATORY (INHALATION)
Qty: 75 | Refills: 3 | Status: ACTIVE | COMMUNITY
Start: 2021-01-01 | End: 1900-01-01

## 2021-01-01 RX ORDER — ZINC SULFATE 50(220)MG
50 CAPSULE ORAL
Refills: 0 | Status: ACTIVE | COMMUNITY

## 2021-01-01 RX ORDER — TRIAMCINOLONE ACETONIDE 40 MG/ML
40 SUSPENSION INTRA-ARTERIAL; INTRAMUSCULAR
Qty: 1 | Refills: 0 | Status: COMPLETED | OUTPATIENT
Start: 2021-01-01

## 2021-01-01 RX ORDER — TRAMADOL HYDROCHLORIDE 50 MG/1
50 TABLET, COATED ORAL TWICE DAILY
Qty: 14 | Refills: 0 | Status: ACTIVE | COMMUNITY
Start: 2021-01-01 | End: 1900-01-01

## 2021-01-01 RX ORDER — METHYLPREDNISOLONE 4 MG/1
4 TABLET ORAL
Qty: 1 | Refills: 0 | Status: COMPLETED | COMMUNITY
Start: 2021-01-01 | End: 2021-01-01

## 2021-01-01 RX ADMIN — TRIAMCINOLONE ACETONIDE 1 MG/ML: 40 INJECTION, SUSPENSION INTRA-ARTICULAR; INTRAMUSCULAR at 00:00

## 2021-02-25 ENCOUNTER — APPOINTMENT (OUTPATIENT)
Dept: FAMILY MEDICINE | Facility: CLINIC | Age: 86
End: 2021-02-25
Payer: MEDICARE

## 2021-02-25 ENCOUNTER — LABORATORY RESULT (OUTPATIENT)
Age: 86
End: 2021-02-25

## 2021-02-25 VITALS
HEART RATE: 70 BPM | HEIGHT: 67 IN | DIASTOLIC BLOOD PRESSURE: 68 MMHG | SYSTOLIC BLOOD PRESSURE: 100 MMHG | BODY MASS INDEX: 27 KG/M2 | RESPIRATION RATE: 14 BRPM | WEIGHT: 172 LBS | OXYGEN SATURATION: 97 % | TEMPERATURE: 97.9 F

## 2021-02-25 DIAGNOSIS — Z86.018 PERSONAL HISTORY OF OTHER BENIGN NEOPLASM: ICD-10-CM

## 2021-02-25 PROCEDURE — 99213 OFFICE O/P EST LOW 20 MIN: CPT | Mod: 25

## 2021-02-25 PROCEDURE — 36415 COLL VENOUS BLD VENIPUNCTURE: CPT

## 2021-02-25 RX ORDER — UBIDECARENONE 50 MG
CAPSULE ORAL
Refills: 0 | Status: ACTIVE | COMMUNITY

## 2021-02-25 RX ORDER — FUROSEMIDE 20 MG/1
20 TABLET ORAL DAILY
Qty: 90 | Refills: 0 | Status: DISCONTINUED | COMMUNITY
Start: 2020-04-24 | End: 2021-02-25

## 2021-02-25 RX ORDER — FUROSEMIDE 40 MG/1
40 TABLET ORAL
Refills: 0 | Status: ACTIVE | COMMUNITY

## 2021-02-25 RX ORDER — NORTRIPTYLINE HYDROCHLORIDE 25 MG/1
25 CAPSULE ORAL
Qty: 90 | Refills: 0 | Status: DISCONTINUED | COMMUNITY
Start: 2020-11-18 | End: 2021-02-25

## 2021-02-25 RX ORDER — FLUTICASONE PROPIONATE 50 UG/1
50 SPRAY, METERED NASAL
Qty: 16 | Refills: 4 | Status: DISCONTINUED | COMMUNITY
Start: 2019-06-24 | End: 2021-02-25

## 2021-02-25 NOTE — PHYSICAL EXAM
[No Acute Distress] : no acute distress [Well Nourished] : well nourished [Well Developed] : well developed [Well-Appearing] : well-appearing [Normal Sclera/Conjunctiva] : normal sclera/conjunctiva [Normal Outer Ear/Nose] : the outer ears and nose were normal in appearance [Normal Oropharynx] : the oropharynx was normal [No Lymphadenopathy] : no lymphadenopathy [Supple] : supple [Thyroid Normal, No Nodules] : the thyroid was normal and there were no nodules present [No Respiratory Distress] : no respiratory distress  [Normal Rate] : normal rate  [Normal S1, S2] : normal S1 and S2 [No Murmur] : no murmur heard [No Carotid Bruits] : no carotid bruits [No Edema] : there was no peripheral edema [No Extremity Clubbing/Cyanosis] : no extremity clubbing/cyanosis [Normal Posterior Cervical Nodes] : no posterior cervical lymphadenopathy [Normal Anterior Cervical Nodes] : no anterior cervical lymphadenopathy [Normal Gait] : normal gait [Normal Affect] : the affect was normal [Alert and Oriented x3] : oriented to person, place, and time [Normal Insight/Judgement] : insight and judgment were intact [de-identified] : + JVD  [de-identified] : irregular, irregular

## 2021-02-25 NOTE — HISTORY OF PRESENT ILLNESS
[FreeTextEntry1] : patient presents to establish care with new provider\par  [de-identified] : 89 yr old male history of chronic LT sided pleural effusions, AFib on Eliquis and anxiety presented for routine follow up. Reports recent CXR on 2/4/2021 for evaluation of effusions. He had recent thoracentesis which removed 3L. His Lasix was increased from 20 to 40 QD.  States he is overall well. Admits to chronic SOB that is improved with Lasix. Denies chest pain, palpitations, dizziness, falls, hematuria and bloody stools. \par \par

## 2021-02-25 NOTE — PLAN
[FreeTextEntry1] : 89 yr old male presented for routine follow up. \par \par 1. AFib on Eliquis: \par stable, continue current medications as directed \par /68 P 78\par advised pt not to rise immediately from seated position \par risk of falls due to AC \par f/u with cardiology as directed. \par \par 2. HLD: \par continue current medication as directed \par \par 3. Chronic LT pleural effusions: \par reviewed most recent CXR \par i have reviewed [facility] consultation notes \par take increased Lasix dose 40 mg daily per pulm \par f/u with pulm as directed \par \par 4. BPH \par stable continue current medication \par f/u with uro as directed \par \par 5. Anxiety: \par stable, renewed alprazolam 0.25 mg TID PRN anxiety \par risk of respiratory suppression and sedation have been discussed \par I-STOP  #: [id number]\par \par Routine lab work today to screen for anemia, CAD, liver and kidney abnormalities, and thyroid disorders (CBC, CMP, lipid, TSH) \par RTO as routine for follow up \par \par \par \par \par \par

## 2021-02-25 NOTE — CURRENT MEDS
[Takes medication as prescribed] : takes [None] : Patient does not have any barriers to medication adherence [Yes] : Reviewed medication list for presence of high-risk medications. [Benzodiazepines] : benzodiazepines

## 2021-02-26 DIAGNOSIS — E87.1 HYPO-OSMOLALITY AND HYPONATREMIA: ICD-10-CM

## 2021-02-26 DIAGNOSIS — R74.01 ELEVATION OF LEVELS OF LIVER TRANSAMINASE LEVELS: ICD-10-CM

## 2021-02-26 LAB
ALBUMIN SERPL ELPH-MCNC: 4.1 G/DL
ALP BLD-CCNC: 204 U/L
ALT SERPL-CCNC: 28 U/L
ANION GAP SERPL CALC-SCNC: 12 MMOL/L
AST SERPL-CCNC: 33 U/L
BILIRUB SERPL-MCNC: 0.6 MG/DL
BUN SERPL-MCNC: 23 MG/DL
CALCIUM SERPL-MCNC: 9.4 MG/DL
CHLORIDE SERPL-SCNC: 95 MMOL/L
CHOLEST SERPL-MCNC: 122 MG/DL
CO2 SERPL-SCNC: 23 MMOL/L
CREAT SERPL-MCNC: 0.94 MG/DL
GLUCOSE SERPL-MCNC: 108 MG/DL
HDLC SERPL-MCNC: 68 MG/DL
LDLC SERPL CALC-MCNC: 45 MG/DL
NONHDLC SERPL-MCNC: 54 MG/DL
POTASSIUM SERPL-SCNC: 4.8 MMOL/L
PROT SERPL-MCNC: 7.5 G/DL
SODIUM SERPL-SCNC: 130 MMOL/L
TRIGL SERPL-MCNC: 44 MG/DL
TSH SERPL-ACNC: 3.04 UIU/ML

## 2021-03-08 DIAGNOSIS — K76.89 OTHER SPECIFIED DISEASES OF LIVER: ICD-10-CM

## 2021-04-06 ENCOUNTER — RESULT REVIEW (OUTPATIENT)
Age: 86
End: 2021-04-06

## 2021-07-19 NOTE — PHYSICAL EXAM
[Normal Rate] : normal [Rhythm Regular] : regular [Normal S1] : normal S1 [Normal S2] : normal S2 [C-Spine ___ (level)] : ~Ulevel [unfilled] cervical spine [Restricted] : was restricted [Pain] : was painful [Normal] : affect was normal and insight and judgment were intact [de-identified] : hard of hearing

## 2021-07-19 NOTE — ASSESSMENT
[FreeTextEntry1] : mri cervical spine no contrast ro HDD\par .kenalog 40 mg im given left gluteus for inflammation\par heat to the area\par gentle stretching.\par Follow up results\par Activity as tolerated\par Go to ER if worse chest pain or shortness of breath or neck pain.\par \par We spent sufficient time to discuss aspects of care; questions were answered  to patient's satisfaction.The diagnosis and care plan were discussed with patient in detail.  Patient test results were  reviewed and explained in full. All questions and concerns  were answered to the best of my knowledge.\par \par

## 2021-07-19 NOTE — HISTORY OF PRESENT ILLNESS
[FreeTextEntry8] : pt c/o of severe neck pain\par he was in  air condition room and the fan was blowing and he felt something in his back and it was all in his shoulders  and it is still in his neck up to his head and it quite painful.  He can lift his head but the slightest movement is tender.\par \par He recently had pleural effusion .  He has hx of afib. \par \par He was in the ER recently  for taking cyclobenzaprine  ( too strong).They kept him for 6 hrs for observation [Spouse] : spouse [Family Member] : family member

## 2021-07-19 NOTE — HEALTH RISK ASSESSMENT
[Intercurrent ED visits] : went to ED [Intercurrent hospitalizations] : was admitted to the hospital  [] : No [Never (0 pts)] : Never (0 points) [No] : In the past 12 months have you used drugs other than those required for medical reasons? No [No falls in past year] : Patient reported no falls in the past year [de-identified] : no [de-identified] : no

## 2021-08-31 PROBLEM — E78.5 HYPERLIPIDEMIA: Status: ACTIVE | Noted: 2019-04-19

## 2021-08-31 NOTE — HISTORY OF PRESENT ILLNESS
[FreeTextEntry1] : patient presents for medication renewal  [de-identified] : 89 yr old male is here for follow up and medication refills. He admits to fatigue. Denies changes in vision, dizziness, chest pain, palpitations and lower extremity edema.\par

## 2021-08-31 NOTE — PLAN
[FreeTextEntry1] : 89 yr old male follow up \par \par continue all medications as directed \par healthy diet and physical activity as tolerated\par f/u with cardio, pulm and urology as directed \par I have reviewed most recent blood work \par pt will need Xanax refills in 2 months \par RTO as routine for follow up.\par

## 2021-08-31 NOTE — PHYSICAL EXAM
[No Acute Distress] : no acute distress [Normal Sclera/Conjunctiva] : normal sclera/conjunctiva [Normal Outer Ear/Nose] : the outer ears and nose were normal in appearance [No JVD] : no jugular venous distention [No Respiratory Distress] : no respiratory distress  [No Accessory Muscle Use] : no accessory muscle use [Normal Rate] : normal rate  [No Murmur] : no murmur heard [No Extremity Clubbing/Cyanosis] : no extremity clubbing/cyanosis [Normal Gait] : normal gait [Normal Affect] : the affect was normal [Alert and Oriented x3] : oriented to person, place, and time [de-identified] : left lung base diminished  [de-identified] : irregular, irregular  [de-identified] : varicose veins

## 2021-10-04 PROBLEM — Z23 ENCOUNTER FOR IMMUNIZATION: Status: ACTIVE | Noted: 2020-11-18

## 2021-10-04 PROBLEM — S09.90XA HEAD TRAUMA: Status: ACTIVE | Noted: 2021-01-01

## 2021-10-04 PROBLEM — M53.3 COCCYX PAIN: Status: ACTIVE | Noted: 2021-01-01

## 2021-10-04 NOTE — PHYSICAL EXAM
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

## 2021-10-04 NOTE — ASSESSMENT
[FreeTextEntry1] : Patient presented with neck and low back pain r/t fall 1 week ago at home. \par Ordere x-ray of coccyx to r.o fx\par order MRI of head to r.o stroke\par instructed patient to return to emergency room if develops weakness, dizziness, vomiting, or lethargy. \par counseled pt on fall prevention\par reviewed symptoms of concussion\par f/u with results We spent sufficient time to discuss aspects of care; questions were answered  to patient's satisfaction.The diagnosis and care plan were discussed with patient in detail.  Patient test results were  reviewed and explained in full. All questions and concerns  were answered to the best of my knowledge.\par

## 2021-10-04 NOTE — HEALTH RISK ASSESSMENT
[Any fall with injury in past year] : Patient reported fall with injury in the past year [de-identified] : coccyx and neck pain

## 2021-10-04 NOTE — REVIEW OF SYSTEMS
[Frequency] : frequency [Back Pain] : back pain [Negative] : Integumentary [FreeTextEntry2] : coccyx and neck pain [FreeTextEntry9] : neck pain

## 2021-10-04 NOTE — HISTORY OF PRESENT ILLNESS
[FreeTextEntry8] : Patient presents for falling and hurting tailbone 1 week ago hurts when he is sitting -bruising, -swelling. Wife also states he hit his head on the floor when he fell backwards -headache, -blurry vision . He feels he cannot sleep well or sit well because of fall and is concerned about his tail bone. has neck and back pain. He also hit the back of his head and was disoriented and was concerned for concussion. Denies LOC. Has had some worse vision but does not relate to fall. denies slurred speech, vomiting, drowsiness, but does get up during the  night to urinate frequently. States he was not concentrating when he fell backwards and doesn't really know why he fell.

## 2021-11-08 PROBLEM — G47.00 INSOMNIA: Status: ACTIVE | Noted: 2017-11-30

## 2021-11-08 PROBLEM — L76.82 INCISIONAL PAIN: Status: ACTIVE | Noted: 2021-01-01

## 2021-11-08 PROBLEM — Z87.39 HISTORY OF BACK PAIN: Status: RESOLVED | Noted: 2017-10-23 | Resolved: 2021-01-01

## 2021-11-08 PROBLEM — M54.2 ACUTE NECK PAIN: Status: ACTIVE | Noted: 2021-01-01

## 2021-11-08 NOTE — PLAN
[FreeTextEntry1] : Reviewed nuclear stress test from cardiologist\par pt to have procedure soon and pending clearance, will have information faxed here\par \par HTN well controlled \par would benefit from CBC/CMP, lipids, at next office visit\par \par anxiety- renew alprazolam, would like to come off medication when health issues resolve\par \par Tramadol- takes once a day for pain, okay to have until procedure, educated patient on importance of only taking if necessary, states it has been helping greatly with pain\par \par Follow up as needed \par \par Istop checked

## 2021-11-08 NOTE — HISTORY OF PRESENT ILLNESS
[FreeTextEntry1] : pt presents for med follow up  [de-identified] : Presenting in office today for medication renewal, he was recently seen at the [facility] after developing severe back pain related to fluid in lungs. He was discharged with tramadol for pain and that was working really well for him, he is wondering if he could have a refill to hold him over as he is having surgery once he gets approval to remove fluid and will feel much better then. He would also like a renewal of his xanax. Pt is seeing Dr. Samantha Agee to have incision in pleura for removal of fluid and is followed by cardiologist Dr. Bales

## 2021-11-08 NOTE — CURRENT MEDS
[Takes medication as prescribed] : takes [None] : Patient does not have any barriers to medication adherence [Yes] : Reviewed medication list for presence of high-risk medications. [Benzodiazepines] : benzodiazepines [FreeTextEntry1] : Patient would like to discontinue medications when has procedure to remove fluid in lung, educated patient not to drive or drink alcohol while taking this medication as severe respiratory depression can occur. Take medications only as prescribed.

## 2021-12-30 PROBLEM — R06.2 WHEEZING: Status: ACTIVE | Noted: 2021-01-01

## 2022-01-01 ENCOUNTER — APPOINTMENT (OUTPATIENT)
Dept: FAMILY MEDICINE | Facility: CLINIC | Age: 87
End: 2022-01-01
Payer: MEDICARE

## 2022-01-01 ENCOUNTER — NON-APPOINTMENT (OUTPATIENT)
Age: 87
End: 2022-01-01

## 2022-01-01 ENCOUNTER — OUTPATIENT (OUTPATIENT)
Dept: OUTPATIENT SERVICES | Facility: HOSPITAL | Age: 87
LOS: 1 days | End: 2022-01-01

## 2022-01-01 ENCOUNTER — APPOINTMENT (OUTPATIENT)
Dept: FAMILY MEDICINE | Facility: CLINIC | Age: 87
End: 2022-01-01

## 2022-01-01 VITALS
BODY MASS INDEX: 24.25 KG/M2 | DIASTOLIC BLOOD PRESSURE: 60 MMHG | OXYGEN SATURATION: 95 % | HEIGHT: 68 IN | RESPIRATION RATE: 12 BRPM | TEMPERATURE: 97 F | HEART RATE: 77 BPM | WEIGHT: 160 LBS | SYSTOLIC BLOOD PRESSURE: 94 MMHG

## 2022-01-01 VITALS
DIASTOLIC BLOOD PRESSURE: 60 MMHG | HEIGHT: 68 IN | RESPIRATION RATE: 14 BRPM | BODY MASS INDEX: 23.49 KG/M2 | WEIGHT: 155 LBS | OXYGEN SATURATION: 96 % | HEART RATE: 78 BPM | SYSTOLIC BLOOD PRESSURE: 120 MMHG

## 2022-01-01 DIAGNOSIS — A41.9 SEPSIS, UNSPECIFIED ORGANISM: ICD-10-CM

## 2022-01-01 DIAGNOSIS — D64.9 ANEMIA, UNSPECIFIED: ICD-10-CM

## 2022-01-01 DIAGNOSIS — E87.6 HYPOKALEMIA: ICD-10-CM

## 2022-01-01 DIAGNOSIS — M10.9 GOUT, UNSPECIFIED: ICD-10-CM

## 2022-01-01 DIAGNOSIS — R05.9 COUGH, UNSPECIFIED: ICD-10-CM

## 2022-01-01 DIAGNOSIS — I50.31 ACUTE DIASTOLIC (CONGESTIVE) HEART FAILURE: ICD-10-CM

## 2022-01-01 DIAGNOSIS — J91.8 PLEURAL EFFUSION IN OTHER CONDITIONS CLASSIFIED ELSEWHERE: ICD-10-CM

## 2022-01-01 DIAGNOSIS — I10 ESSENTIAL (PRIMARY) HYPERTENSION: ICD-10-CM

## 2022-01-01 DIAGNOSIS — J18.9 PNEUMONIA, UNSPECIFIED ORGANISM: ICD-10-CM

## 2022-01-01 DIAGNOSIS — R53.83 OTHER FATIGUE: ICD-10-CM

## 2022-01-01 DIAGNOSIS — U07.1 COVID-19: ICD-10-CM

## 2022-01-01 DIAGNOSIS — N39.0 URINARY TRACT INFECTION, SITE NOT SPECIFIED: ICD-10-CM

## 2022-01-01 DIAGNOSIS — Z86.16 PERSONAL HISTORY OF COVID-19: ICD-10-CM

## 2022-01-01 DIAGNOSIS — E87.0 HYPEROSMOLALITY AND HYPERNATREMIA: ICD-10-CM

## 2022-01-01 DIAGNOSIS — I48.91 UNSPECIFIED ATRIAL FIBRILLATION: Chronic | ICD-10-CM

## 2022-01-01 DIAGNOSIS — F41.9 ANXIETY DISORDER, UNSPECIFIED: ICD-10-CM

## 2022-01-01 DIAGNOSIS — R09.82 POSTNASAL DRIP: ICD-10-CM

## 2022-01-01 DIAGNOSIS — J90 PLEURAL EFFUSION, NOT ELSEWHERE CLASSIFIED: ICD-10-CM

## 2022-01-01 LAB
ALBUMIN SERPL ELPH-MCNC: 3.6 G/DL
ALP BLD-CCNC: 221 U/L
ALT SERPL-CCNC: 44 U/L
ANION GAP SERPL CALC-SCNC: 11 MMOL/L
AST SERPL-CCNC: 35 U/L
BASOPHILS # BLD AUTO: 0.02 K/UL
BASOPHILS NFR BLD AUTO: 0.2 %
BILIRUB SERPL-MCNC: 0.6 MG/DL
BUN SERPL-MCNC: 25 MG/DL
CALCIUM SERPL-MCNC: 8.8 MG/DL
CHLORIDE SERPL-SCNC: 99 MMOL/L
CO2 SERPL-SCNC: 27 MMOL/L
CREAT SERPL-MCNC: 0.87 MG/DL
EOSINOPHIL # BLD AUTO: 0.12 K/UL
EOSINOPHIL NFR BLD AUTO: 1.2 %
FERRITIN SERPL-MCNC: 165 NG/ML
GLUCOSE SERPL-MCNC: 128 MG/DL
HCT VFR BLD CALC: 39.3 %
HGB BLD-MCNC: 12.2 G/DL
IMM GRANULOCYTES NFR BLD AUTO: 0.8 %
IRON SATN MFR SERPL: 19 %
IRON SERPL-MCNC: 51 UG/DL
LYMPHOCYTES # BLD AUTO: 0.98 K/UL
LYMPHOCYTES NFR BLD AUTO: 10.2 %
MAN DIFF?: NORMAL
MCHC RBC-ENTMCNC: 27.2 PG
MCHC RBC-ENTMCNC: 31 GM/DL
MCV RBC AUTO: 87.5 FL
MONOCYTES # BLD AUTO: 1.01 K/UL
MONOCYTES NFR BLD AUTO: 10.5 %
NEUTROPHILS # BLD AUTO: 7.43 K/UL
NEUTROPHILS NFR BLD AUTO: 77.1 %
PLATELET # BLD AUTO: 297 K/UL
POTASSIUM SERPL-SCNC: 4.3 MMOL/L
PROT SERPL-MCNC: 6.4 G/DL
RBC # BLD: 4.49 M/UL
RBC # FLD: 15.6 %
SODIUM SERPL-SCNC: 137 MMOL/L
TIBC SERPL-MCNC: 267 UG/DL
TSH SERPL-ACNC: 2.64 UIU/ML
UIBC SERPL-MCNC: 216 UG/DL
WBC # FLD AUTO: 9.64 K/UL

## 2022-01-01 PROCEDURE — 99213 OFFICE O/P EST LOW 20 MIN: CPT

## 2022-01-01 PROCEDURE — 99442: CPT | Mod: CS,95

## 2022-01-01 PROCEDURE — 99214 OFFICE O/P EST MOD 30 MIN: CPT | Mod: 25

## 2022-01-01 PROCEDURE — 36415 COLL VENOUS BLD VENIPUNCTURE: CPT

## 2022-01-01 RX ORDER — ALPRAZOLAM 0.25 MG/1
0.25 TABLET ORAL
Qty: 90 | Refills: 0 | Status: ACTIVE | COMMUNITY
Start: 2017-11-30 | End: 1900-01-01

## 2022-01-01 RX ORDER — GABAPENTIN 100 MG/1
100 CAPSULE ORAL
Qty: 180 | Refills: 1 | Status: ACTIVE | COMMUNITY
Start: 2022-01-01

## 2022-01-01 RX ORDER — IPRATROPIUM BROMIDE 21 UG/1
0.03 SPRAY NASAL 3 TIMES DAILY
Qty: 1 | Refills: 1 | Status: ACTIVE | COMMUNITY
Start: 2022-01-01 | End: 1900-01-01

## 2022-01-01 RX ORDER — METHYLPREDNISOLONE 4 MG/1
4 TABLET ORAL
Qty: 1 | Refills: 0 | Status: COMPLETED | COMMUNITY
Start: 2022-01-01 | End: 2022-01-01

## 2022-01-03 PROBLEM — U07.1 ACUTE COVID-19: Status: ACTIVE | Noted: 2022-01-01

## 2022-01-03 PROBLEM — R09.82 POSTNASAL DRIP: Status: ACTIVE | Noted: 2019-06-24

## 2022-01-03 PROBLEM — R05.9 COUGH: Status: ACTIVE | Noted: 2022-01-01

## 2022-01-03 NOTE — REVIEW OF SYSTEMS
[Fatigue] : fatigue [Postnasal Drip] : postnasal drip [Nasal Discharge] : nasal discharge [Cough] : cough [Negative] : Cardiovascular [Fever] : no fever [Chills] : no chills [Sore Throat] : no sore throat [Shortness Of Breath] : no shortness of breath

## 2022-01-03 NOTE — HISTORY OF PRESENT ILLNESS
[Home] : at home, [unfilled] , at the time of the visit. [Medical Office: ([facility])___] : at the medical office located in  [Verbal consent obtained from patient] : the patient, [unfilled] [Spouse] : spouse [FreeTextEntry8] : 89 yo male, multiple comorbidies including recurrent pleural effusions presenting via telephonic for follow up. \par Both patient and wife have covid\par On monday pt developed fever and collapsed in bathroom\par [place] [facility] [place] \par He was admitted overnight for covid\par Was told possible infection/bacterial PNA in addition to covid\par On 12/9 had [facility] pleural effusion procedure, pleuradiasis, and was at [facility] for 6 days draining the left lung due to recurrent effusions. \par Per patient and wife, was told by [facility] there might be recurrence of the effusion and to follow up with his ct sx.\par Has been sent with amox 875 bid which he completed the course after discharge. \par Pt's wife says he is still lethargic. Fatigued. \par He is using neb treatment every 4 hours. \par Pt has not been able to follow back up with CT Sx as their office is not returning calls. \par Has dry cough, nasal congestion with post nasal drip. \par Not currently dyspneic at rest. \par Pt feels better after using nebulizer. \par Denies chest pain. \par

## 2022-01-03 NOTE — ASSESSMENT
[FreeTextEntry1] : COVID19 with cough and PND\par Atrovent nasal spray to dry out post nasal drip, advised to avoid if headache or nose bleeds\par Medrol dose pack to help decongest, advised of steroid side effects including insomnia and GI upset and to take with food. \par Advised to maintain low threshold to escalate care back to Ed if worsening dyspnea, cough, fevers, chest pain. Both patient and wife verbalized understanding of this to me. \par \par Pleural Effusion\par CT Sx consult from nov 2021 noted\par had planned pleurodesis done in dec 2021 at [facility]\par I advised that if [facility] noted possible recurrence of effusion, that they need to contact ctsx team and request further guidance on next steps. \par pt completed course of amoxicillin on [facility] discharge and remains afebrile. will defer any additional abx at this time. \par \par Patient and wife agree with plan, all further questions answered during encounter.\par \par \par \par

## 2022-01-11 PROBLEM — I48.91 ATRIAL FIBRILLATION: Chronic | Status: ACTIVE | Noted: 2020-04-24

## 2022-01-11 PROBLEM — Z86.16 HISTORY OF COVID-19: Status: ACTIVE | Noted: 2022-01-01

## 2022-01-11 PROBLEM — R53.83 FATIGUE: Status: ACTIVE | Noted: 2022-01-01

## 2022-01-11 PROBLEM — D64.9 ANEMIA: Status: ACTIVE | Noted: 2022-01-01

## 2022-01-11 NOTE — ASSESSMENT
[FreeTextEntry1] : Fatigue in setting of Anemia, recent covid19 infection, pleural effusion. \par undiagnosed new problem with uncertain prognosis/etiology \par Dec 2021 [facility] cbc noted in HIE showing hb 10.\par repeat cbc today to rule out anemia\par iron and ferritin levels ordered to assess for iron deficiency\par checking tsh and reflex t4 today. \par CT surgery consult from 11/2021. \par [facility] home care dc summary from 1/7/22 noted \par pt due to see CT surg next week. \par I advised that patient could be experiencing fatigue from multifactorial reasons, post covid fatigue, decreased oxygenation due to recurrent effusion and newly noted anemia. \par based on lab results, will determine further plan of care. \par \par AFIB\par chronic\par Rate controlled with Beta Blocker \par Cardio Consult noted from 11/2021 \par Labs checking for lytes, bmp\par VTE PPx with eliquis \par Bleeding/Bruising Precautions discussed and advised to call if experiencing any episodes. \par \par \par labs drawn in office and patient will be notified of results when available\par Patient agrees with plan, all further questions answered during encounter.\par \par \par

## 2022-01-11 NOTE — HISTORY OF PRESENT ILLNESS
[Spouse] : spouse [FreeTextEntry1] : Patient presents for follow up. Patients blood pressure is running low, states he feels weak. [de-identified] : 89 yo male, multiple comorbidities including afib on eliquis, recurrent pleural effusions, recent covid19 infection admitted for PNA at [facility] who presents today in office for follow up. \par Patient is hard of hearing, wife present and provides collateral history. \par Patient has been having increased fatigue and weakness. \par Feels that he is sleeping a lot more. \par Denies any bleeding/bruising in stool or urine while on NOAC. \par Wife says that patient is due to follow up with [facility] next week for recurrent effusions. \par Due to see urology tomorrow. Has been holding lasix at this time due to weakness and inability to easily ambulate to bathroom. \par Both patient and wife say that they were not sure what the results from [facility] labs showed. \par

## 2022-01-11 NOTE — PHYSICAL EXAM
[No Acute Distress] : no acute distress [Normal] : normal sclera/conjunctiva, pupils are equal, round and reactive to light and extraocular movements are intact [No Respiratory Distress] : no respiratory distress  [No Accessory Muscle Use] : no accessory muscle use [Normal Rate] : normal rate  [Normal S1, S2] : normal S1 and S2 [Pedal Pulses Present] : the pedal pulses are present [Soft] : abdomen soft [Non Tender] : non-tender [Normal Bowel Sounds] : normal bowel sounds [Coordination Grossly Intact] : coordination grossly intact [No Focal Deficits] : no focal deficits [de-identified] : elderly male. well groomed. pleasant.  [de-identified] : decreased bibasilar breath sounds.  [de-identified] : irregularly irregular  [de-identified] : b/l lower extremity varicosities with right ankle 2+ non pitting edema. no calf tenderness bilaterally

## 2022-01-11 NOTE — PHYSICAL EXAM
[No Acute Distress] : no acute distress [Normal] : normal sclera/conjunctiva, pupils are equal, round and reactive to light and extraocular movements are intact [No Respiratory Distress] : no respiratory distress  [No Accessory Muscle Use] : no accessory muscle use [Normal Rate] : normal rate  [Normal S1, S2] : normal S1 and S2 [Pedal Pulses Present] : the pedal pulses are present [Soft] : abdomen soft [Non Tender] : non-tender [Normal Bowel Sounds] : normal bowel sounds [Coordination Grossly Intact] : coordination grossly intact [No Focal Deficits] : no focal deficits [de-identified] : elderly male. well groomed. pleasant.  [de-identified] : decreased bibasilar breath sounds.  [de-identified] : irregularly irregular  [de-identified] : b/l lower extremity varicosities with right ankle 2+ non pitting edema. no calf tenderness bilaterally

## 2022-01-11 NOTE — HISTORY OF PRESENT ILLNESS
[Spouse] : spouse [FreeTextEntry1] : Patient presents for follow up. Patients blood pressure is running low, states he feels weak. [de-identified] : 89 yo male, multiple comorbidities including afib on eliquis, recurrent pleural effusions, recent covid19 infection admitted for PNA at [facility] who presents today in office for follow up. \par Patient is hard of hearing, wife present and provides collateral history. \par Patient has been having increased fatigue and weakness. \par Feels that he is sleeping a lot more. \par Denies any bleeding/bruising in stool or urine while on NOAC. \par Wife says that patient is due to follow up with [facility]TS[facility] next week for recurrent effusions. \par Due to see urology tomorrow. Has been holding lasix at this time due to weakness and inability to easily ambulate to bathroom. \par Both patient and wife say that they were not sure what the results from [facility] labs showed. \par

## 2022-02-22 PROBLEM — J90 PLEURAL EFFUSION: Status: ACTIVE | Noted: 2020-04-24

## 2022-02-22 PROBLEM — F41.9 ANXIETY: Status: ACTIVE | Noted: 2017-11-30

## 2022-02-22 PROBLEM — I10 HTN (HYPERTENSION): Status: ACTIVE | Noted: 2018-02-15

## 2022-02-22 NOTE — HISTORY OF PRESENT ILLNESS
[FreeTextEntry1] : pt presents for med check  [de-identified] : 89 yo wm here for anxious. He takes it at night to help sleep otherwise he does not sleep. \par He has a hx of paroxysmal afib and pleural effusion. A nurse comes 2x a week and he has a port to drain any fluid that collects in the lungs.\par He states he has been on xanax for 6 years. he takes it every night. He states he has not fallen. He denies depression. He denies Suicidal ideation. He has 1 glass of wine with dinner. He takes the xanax and he sleeps fine,. \par he states he is not on atorvastatin anymore

## 2022-02-22 NOTE — PHYSICAL EXAM
[Normal Rate] : normal [Irregularly Irregular] : irregularly irregular [Normal S1] : normal S1 [Normal S2] : normal S2 [de-identified] : left side diminished bs  right anterior device . right lower lungs clear

## 2022-02-22 NOTE — ASSESSMENT
[FreeTextEntry1] : discussed xanax. he states he takes it for sleep.  As per usual protocol the patient was advised in regards to the risks of driving when on medications with side effects of dizziness or drowsiness. Patient has been assessed  for increase risk for respiratory depression. Patient denies suicidal ideations or plan.  Istop checked.\par I suggested other options. lexapro, nortriplyline, remeron, zoloft. he will talk it over with his wife. \par We spent sufficient time to discuss aspects of care; questions were answered  to patient's satisfaction.The diagnosis and care plan were discussed with patient in detail.  Patient test results were  reviewed and explained in full. All questions and concerns  were answered to the best of my knowledge.\par

## 2022-04-11 ENCOUNTER — NON-APPOINTMENT (OUTPATIENT)
Age: 87
End: 2022-04-11

## 2022-04-11 DIAGNOSIS — Z20.828 CONTACT WITH AND (SUSPECTED) EXPOSURE TO OTHER VIRAL COMMUNICABLE DISEASES: ICD-10-CM
